# Patient Record
Sex: MALE | Race: WHITE | Employment: OTHER | URBAN - METROPOLITAN AREA
[De-identification: names, ages, dates, MRNs, and addresses within clinical notes are randomized per-mention and may not be internally consistent; named-entity substitution may affect disease eponyms.]

---

## 2017-12-14 ENCOUNTER — OFFICE VISIT (OUTPATIENT)
Dept: INTERNAL MEDICINE CLINIC | Age: 62
End: 2017-12-14

## 2017-12-14 VITALS
RESPIRATION RATE: 12 BRPM | HEART RATE: 71 BPM | BODY MASS INDEX: 30.4 KG/M2 | HEIGHT: 73 IN | DIASTOLIC BLOOD PRESSURE: 80 MMHG | OXYGEN SATURATION: 97 % | SYSTOLIC BLOOD PRESSURE: 118 MMHG | TEMPERATURE: 98.3 F | WEIGHT: 229.4 LBS

## 2017-12-14 DIAGNOSIS — M67.431 GANGLION CYST OF VOLAR ASPECT OF RIGHT WRIST: Primary | ICD-10-CM

## 2017-12-14 DIAGNOSIS — R73.9 HYPERGLYCEMIA: ICD-10-CM

## 2017-12-14 PROCEDURE — G8484 FLU IMMUNIZE NO ADMIN: HCPCS | Performed by: INTERNAL MEDICINE

## 2017-12-14 PROCEDURE — G8427 DOCREV CUR MEDS BY ELIG CLIN: HCPCS | Performed by: INTERNAL MEDICINE

## 2017-12-14 PROCEDURE — 1036F TOBACCO NON-USER: CPT | Performed by: INTERNAL MEDICINE

## 2017-12-14 PROCEDURE — G8417 CALC BMI ABV UP PARAM F/U: HCPCS | Performed by: INTERNAL MEDICINE

## 2017-12-14 PROCEDURE — 3017F COLORECTAL CA SCREEN DOC REV: CPT | Performed by: INTERNAL MEDICINE

## 2017-12-14 PROCEDURE — 99214 OFFICE O/P EST MOD 30 MIN: CPT | Performed by: INTERNAL MEDICINE

## 2017-12-14 RX ORDER — ASPIRIN 325 MG
325 TABLET ORAL DAILY
COMMUNITY

## 2017-12-14 ASSESSMENT — PATIENT HEALTH QUESTIONNAIRE - PHQ9
2. FEELING DOWN, DEPRESSED OR HOPELESS: 0
1. LITTLE INTEREST OR PLEASURE IN DOING THINGS: 0
SUM OF ALL RESPONSES TO PHQ QUESTIONS 1-9: 0
SUM OF ALL RESPONSES TO PHQ9 QUESTIONS 1 & 2: 0

## 2017-12-14 ASSESSMENT — ENCOUNTER SYMPTOMS
SHORTNESS OF BREATH: 0
COUGH: 0

## 2017-12-14 NOTE — PROGRESS NOTES
Subjective:      Patient ID: Gloria Stern is a 65 y.o. male. HPI     Chief Complaint   Patient presents with    Cyst     Right [address]       Hx reviewed  Weight discussed    Saw Dr Mayela Watson a swelling over the wrist last summer, went away  Started coming back 3 weeks ago  Doesn't hurt when working out, does afterward  Not had this before    No direct trauma recalled    Current Outpatient Prescriptions   Medication Sig Dispense Refill    aspirin 325 MG tablet Take 325 mg by mouth daily      MULTIPLE VITAMIN PO Take 1 tablet by mouth daily. No current facility-administered medications for this visit. Allergies:  No Known Allergies  Prior Medical History:       Diagnosis Date    Chronic back pain     Esophageal reflux     Insomnia, unspecified     Other anxiety states      Social History  Social History   Substance Use Topics    Smoking status: Never Smoker    Smokeless tobacco: Never Used    Alcohol use 12.6 oz/week     21 Cans of beer per week     Prior Surgical History:      Procedure Laterality Date    COLONOSCOPY  2015    Dr. Marthann Essex - internal hemorrhoids, polyps, repeat in 5 years    FOOT SURGERY Right     right big toe correction    TONSILLECTOMY AND ADENOIDECTOMY  1963    VASECTOMY      VASECTOMY      WISDOM TOOTH EXTRACTION  1968     Family History:       Problem Relation Age of Onset   [de-identified] Cancer Mother      Breast cancer survivor-80 years old   [de-identified] Diabetes Father       at 79-heavy smoker    Other Sister      Healthy 64year old    Cancer Son 10     hodgkins          Review of Systems   Constitutional: Negative for chills, fever and unexpected weight change. Respiratory: Negative for cough and shortness of breath. Cardiovascular: Negative for chest pain and leg swelling. Musculoskeletal: Negative for arthralgias and myalgias. Neurological: Negative for weakness and numbness. Hematological: Negative for adenopathy.  Does not

## 2018-03-14 ENCOUNTER — OFFICE VISIT (OUTPATIENT)
Dept: INTERNAL MEDICINE CLINIC | Age: 63
End: 2018-03-14

## 2018-03-14 VITALS
TEMPERATURE: 98.1 F | DIASTOLIC BLOOD PRESSURE: 74 MMHG | OXYGEN SATURATION: 98 % | HEIGHT: 73 IN | BODY MASS INDEX: 30.17 KG/M2 | SYSTOLIC BLOOD PRESSURE: 126 MMHG | HEART RATE: 66 BPM | RESPIRATION RATE: 12 BRPM | WEIGHT: 227.6 LBS

## 2018-03-14 DIAGNOSIS — Z11.59 ENCOUNTER FOR HEPATITIS C SCREENING TEST FOR LOW RISK PATIENT: ICD-10-CM

## 2018-03-14 DIAGNOSIS — Z12.5 PROSTATE CANCER SCREENING: ICD-10-CM

## 2018-03-14 DIAGNOSIS — Z00.00 ANNUAL PHYSICAL EXAM: Primary | ICD-10-CM

## 2018-03-14 DIAGNOSIS — H91.8X9 OTHER SPECIFIED FORMS OF HEARING LOSS, UNSPECIFIED LATERALITY: ICD-10-CM

## 2018-03-14 DIAGNOSIS — M67.431 GANGLION CYST OF VOLAR ASPECT OF RIGHT WRIST: ICD-10-CM

## 2018-03-14 DIAGNOSIS — R73.9 HYPERGLYCEMIA: ICD-10-CM

## 2018-03-14 PROBLEM — H91.90 HEARING IMPAIRED: Status: ACTIVE | Noted: 2018-03-14

## 2018-03-14 LAB
BILIRUBIN, POC: NORMAL
BLOOD URINE, POC: NORMAL
CLARITY, POC: CLEAR
COLOR, POC: YELLOW
GLUCOSE URINE, POC: NORMAL
KETONES, POC: NORMAL
LEUKOCYTE EST, POC: NORMAL
NITRITE, POC: NORMAL
PH, POC: 6
PROTEIN, POC: NORMAL
SPECIFIC GRAVITY, POC: 1.01
UROBILINOGEN, POC: 0.2

## 2018-03-14 PROCEDURE — 81002 URINALYSIS NONAUTO W/O SCOPE: CPT | Performed by: INTERNAL MEDICINE

## 2018-03-14 PROCEDURE — 99396 PREV VISIT EST AGE 40-64: CPT | Performed by: INTERNAL MEDICINE

## 2018-03-14 ASSESSMENT — ENCOUNTER SYMPTOMS
COLOR CHANGE: 0
VOICE CHANGE: 0
DIARRHEA: 0
RHINORRHEA: 0
ABDOMINAL DISTENTION: 0
APNEA: 0
NAUSEA: 0
VOMITING: 0
SHORTNESS OF BREATH: 0
COUGH: 0
SINUS PRESSURE: 0
EYE PAIN: 0
CHEST TIGHTNESS: 0
BACK PAIN: 0
WHEEZING: 0
ABDOMINAL PAIN: 0
TROUBLE SWALLOWING: 0
CONSTIPATION: 0

## 2018-03-23 LAB
A/G RATIO: 1.6 (ref 1.1–2.2)
ALBUMIN SERPL-MCNC: 4.7 G/DL (ref 3.4–5)
ALP BLD-CCNC: 45 U/L (ref 40–129)
ALT SERPL-CCNC: 16 U/L (ref 10–40)
ANION GAP SERPL CALCULATED.3IONS-SCNC: 21 MMOL/L (ref 3–16)
AST SERPL-CCNC: 18 U/L (ref 15–37)
BILIRUB SERPL-MCNC: 0.5 MG/DL (ref 0–1)
BUN BLDV-MCNC: 15 MG/DL (ref 7–20)
CALCIUM SERPL-MCNC: 9.5 MG/DL (ref 8.3–10.6)
CHLORIDE BLD-SCNC: 101 MMOL/L (ref 99–110)
CHOLESTEROL, TOTAL: 161 MG/DL (ref 0–199)
CO2: 23 MMOL/L (ref 21–32)
CREAT SERPL-MCNC: 1.1 MG/DL (ref 0.8–1.3)
ESTIMATED AVERAGE GLUCOSE: 116.9 MG/DL
GFR AFRICAN AMERICAN: >60
GFR NON-AFRICAN AMERICAN: >60
GLOBULIN: 2.9 G/DL
GLUCOSE BLD-MCNC: 102 MG/DL (ref 70–99)
HBA1C MFR BLD: 5.7 %
HDLC SERPL-MCNC: 82 MG/DL (ref 40–60)
HEPATITIS C ANTIBODY INTERPRETATION: NORMAL
LDL CHOLESTEROL CALCULATED: 66 MG/DL
POTASSIUM SERPL-SCNC: 4.7 MMOL/L (ref 3.5–5.1)
PROSTATE SPECIFIC ANTIGEN: 0.47 NG/ML (ref 0–4)
SODIUM BLD-SCNC: 145 MMOL/L (ref 136–145)
TOTAL PROTEIN: 7.6 G/DL (ref 6.4–8.2)
TRIGL SERPL-MCNC: 67 MG/DL (ref 0–150)
VLDLC SERPL CALC-MCNC: 13 MG/DL

## 2019-03-14 ENCOUNTER — OFFICE VISIT (OUTPATIENT)
Dept: INTERNAL MEDICINE CLINIC | Age: 64
End: 2019-03-14
Payer: COMMERCIAL

## 2019-03-14 VITALS
BODY MASS INDEX: 30.75 KG/M2 | OXYGEN SATURATION: 97 % | DIASTOLIC BLOOD PRESSURE: 82 MMHG | WEIGHT: 232 LBS | SYSTOLIC BLOOD PRESSURE: 132 MMHG | HEART RATE: 65 BPM | TEMPERATURE: 97.8 F | HEIGHT: 73 IN

## 2019-03-14 DIAGNOSIS — Z12.5 SCREENING FOR PROSTATE CANCER: ICD-10-CM

## 2019-03-14 DIAGNOSIS — Z00.00 PHYSICAL EXAM, ANNUAL: Primary | ICD-10-CM

## 2019-03-14 DIAGNOSIS — K21.00 GASTROESOPHAGEAL REFLUX DISEASE WITH ESOPHAGITIS: Chronic | ICD-10-CM

## 2019-03-14 DIAGNOSIS — F41.8 SITUATIONAL ANXIETY: ICD-10-CM

## 2019-03-14 DIAGNOSIS — Z00.00 PHYSICAL EXAM, ANNUAL: ICD-10-CM

## 2019-03-14 LAB
A/G RATIO: 1.5 (ref 1.1–2.2)
ALBUMIN SERPL-MCNC: 4.5 G/DL (ref 3.4–5)
ALP BLD-CCNC: 53 U/L (ref 40–129)
ALT SERPL-CCNC: 13 U/L (ref 10–40)
ANION GAP SERPL CALCULATED.3IONS-SCNC: 14 MMOL/L (ref 3–16)
AST SERPL-CCNC: 18 U/L (ref 15–37)
BASOPHILS ABSOLUTE: 0 K/UL (ref 0–0.2)
BASOPHILS RELATIVE PERCENT: 0.5 %
BILIRUB SERPL-MCNC: 0.5 MG/DL (ref 0–1)
BUN BLDV-MCNC: 14 MG/DL (ref 7–20)
CALCIUM SERPL-MCNC: 10.3 MG/DL (ref 8.3–10.6)
CHLORIDE BLD-SCNC: 99 MMOL/L (ref 99–110)
CHOLESTEROL, TOTAL: 156 MG/DL (ref 0–199)
CO2: 27 MMOL/L (ref 21–32)
CREAT SERPL-MCNC: 1.2 MG/DL (ref 0.8–1.3)
EOSINOPHILS ABSOLUTE: 0.2 K/UL (ref 0–0.6)
EOSINOPHILS RELATIVE PERCENT: 2.6 %
GFR AFRICAN AMERICAN: >60
GFR NON-AFRICAN AMERICAN: >60
GLOBULIN: 3.1 G/DL
GLUCOSE BLD-MCNC: 105 MG/DL (ref 70–99)
HCT VFR BLD CALC: 46.3 % (ref 40.5–52.5)
HDLC SERPL-MCNC: 71 MG/DL (ref 40–60)
HEMOGLOBIN: 15.5 G/DL (ref 13.5–17.5)
LDL CHOLESTEROL CALCULATED: 71 MG/DL
LYMPHOCYTES ABSOLUTE: 1.8 K/UL (ref 1–5.1)
LYMPHOCYTES RELATIVE PERCENT: 29.9 %
MCH RBC QN AUTO: 29.5 PG (ref 26–34)
MCHC RBC AUTO-ENTMCNC: 33.5 G/DL (ref 31–36)
MCV RBC AUTO: 88 FL (ref 80–100)
MONOCYTES ABSOLUTE: 0.8 K/UL (ref 0–1.3)
MONOCYTES RELATIVE PERCENT: 13.4 %
NEUTROPHILS ABSOLUTE: 3.2 K/UL (ref 1.7–7.7)
NEUTROPHILS RELATIVE PERCENT: 53.6 %
PDW BLD-RTO: 14.9 % (ref 12.4–15.4)
PLATELET # BLD: 198 K/UL (ref 135–450)
PMV BLD AUTO: 7.3 FL (ref 5–10.5)
POTASSIUM SERPL-SCNC: 4.7 MMOL/L (ref 3.5–5.1)
PROSTATE SPECIFIC ANTIGEN: 0.57 NG/ML (ref 0–4)
RBC # BLD: 5.26 M/UL (ref 4.2–5.9)
SODIUM BLD-SCNC: 140 MMOL/L (ref 136–145)
TOTAL PROTEIN: 7.6 G/DL (ref 6.4–8.2)
TRIGL SERPL-MCNC: 72 MG/DL (ref 0–150)
TSH SERPL DL<=0.05 MIU/L-ACNC: 2.89 UIU/ML (ref 0.27–4.2)
VLDLC SERPL CALC-MCNC: 14 MG/DL
WBC # BLD: 5.9 K/UL (ref 4–11)

## 2019-03-14 PROCEDURE — G8484 FLU IMMUNIZE NO ADMIN: HCPCS | Performed by: INTERNAL MEDICINE

## 2019-03-14 PROCEDURE — 99396 PREV VISIT EST AGE 40-64: CPT | Performed by: INTERNAL MEDICINE

## 2019-03-14 RX ORDER — ESOMEPRAZOLE MAGNESIUM 20 MG/1
20 FOR SUSPENSION ORAL DAILY
COMMUNITY

## 2019-03-14 ASSESSMENT — PATIENT HEALTH QUESTIONNAIRE - PHQ9
1. LITTLE INTEREST OR PLEASURE IN DOING THINGS: 0
SUM OF ALL RESPONSES TO PHQ9 QUESTIONS 1 & 2: 0
SUM OF ALL RESPONSES TO PHQ QUESTIONS 1-9: 0
SUM OF ALL RESPONSES TO PHQ QUESTIONS 1-9: 0
2. FEELING DOWN, DEPRESSED OR HOPELESS: 0

## 2019-03-15 LAB
ESTIMATED AVERAGE GLUCOSE: 122.6 MG/DL
HBA1C MFR BLD: 5.9 %

## 2019-04-23 ENCOUNTER — OFFICE VISIT (OUTPATIENT)
Dept: INTERNAL MEDICINE CLINIC | Age: 64
End: 2019-04-23
Payer: COMMERCIAL

## 2019-04-23 VITALS
SYSTOLIC BLOOD PRESSURE: 136 MMHG | WEIGHT: 236.2 LBS | OXYGEN SATURATION: 98 % | DIASTOLIC BLOOD PRESSURE: 88 MMHG | TEMPERATURE: 97.3 F | HEIGHT: 73 IN | HEART RATE: 70 BPM | BODY MASS INDEX: 31.3 KG/M2

## 2019-04-23 DIAGNOSIS — H91.93 DECREASED HEARING OF BOTH EARS: Primary | ICD-10-CM

## 2019-04-23 DIAGNOSIS — H61.23 BILATERAL IMPACTED CERUMEN: ICD-10-CM

## 2019-04-23 PROCEDURE — 3017F COLORECTAL CA SCREEN DOC REV: CPT | Performed by: INTERNAL MEDICINE

## 2019-04-23 PROCEDURE — 99213 OFFICE O/P EST LOW 20 MIN: CPT | Performed by: INTERNAL MEDICINE

## 2019-04-23 PROCEDURE — 1036F TOBACCO NON-USER: CPT | Performed by: INTERNAL MEDICINE

## 2019-04-23 PROCEDURE — G8417 CALC BMI ABV UP PARAM F/U: HCPCS | Performed by: INTERNAL MEDICINE

## 2019-04-23 PROCEDURE — G8427 DOCREV CUR MEDS BY ELIG CLIN: HCPCS | Performed by: INTERNAL MEDICINE

## 2020-07-28 ENCOUNTER — HOSPITAL ENCOUNTER (OUTPATIENT)
Age: 65
Discharge: HOME | End: 2020-07-28
Payer: COMMERCIAL

## 2020-07-28 ENCOUNTER — HOSPITAL ENCOUNTER (OUTPATIENT)
Dept: GENERAL RADIOLOGY | Age: 65
Discharge: HOME | End: 2020-07-28
Payer: COMMERCIAL

## 2020-07-28 ENCOUNTER — NURSE TRIAGE (OUTPATIENT)
Dept: OTHER | Facility: CLINIC | Age: 65
End: 2020-07-28

## 2020-07-28 ENCOUNTER — OFFICE VISIT (OUTPATIENT)
Dept: PRIMARY CARE CLINIC | Age: 65
End: 2020-07-28
Payer: COMMERCIAL

## 2020-07-28 VITALS
SYSTOLIC BLOOD PRESSURE: 118 MMHG | OXYGEN SATURATION: 99 % | DIASTOLIC BLOOD PRESSURE: 70 MMHG | HEART RATE: 93 BPM | RESPIRATION RATE: 15 BRPM | TEMPERATURE: 98.4 F

## 2020-07-28 DIAGNOSIS — M79.671 BILATERAL FOOT PAIN: Chronic | ICD-10-CM

## 2020-07-28 DIAGNOSIS — M79.672 BILATERAL FOOT PAIN: Chronic | ICD-10-CM

## 2020-07-28 LAB
BASOPHILS ABSOLUTE: 0 K/UL (ref 0–0.2)
BASOPHILS RELATIVE PERCENT: 0.2 %
EOSINOPHILS ABSOLUTE: 0 K/UL (ref 0–0.6)
EOSINOPHILS RELATIVE PERCENT: 0.1 %
HCT VFR BLD CALC: 47.1 % (ref 40.5–52.5)
HEMOGLOBIN: 16 G/DL (ref 13.5–17.5)
LYMPHOCYTES ABSOLUTE: 1.1 K/UL (ref 1–5.1)
LYMPHOCYTES RELATIVE PERCENT: 9.4 %
MCH RBC QN AUTO: 31.3 PG (ref 26–34)
MCHC RBC AUTO-ENTMCNC: 33.9 G/DL (ref 31–36)
MCV RBC AUTO: 92.3 FL (ref 80–100)
MONOCYTES ABSOLUTE: 1.3 K/UL (ref 0–1.3)
MONOCYTES RELATIVE PERCENT: 10.9 %
NEUTROPHILS ABSOLUTE: 9.3 K/UL (ref 1.7–7.7)
NEUTROPHILS RELATIVE PERCENT: 79.4 %
PDW BLD-RTO: 13.8 % (ref 12.4–15.4)
PLATELET # BLD: 266 K/UL (ref 135–450)
PMV BLD AUTO: 7.4 FL (ref 5–10.5)
RBC # BLD: 5.1 M/UL (ref 4.2–5.9)
SEDIMENTATION RATE, ERYTHROCYTE: 86 MM/HR (ref 0–20)
URIC ACID, SERUM: 7 MG/DL (ref 3.5–7.2)
WBC # BLD: 11.7 K/UL (ref 4–11)

## 2020-07-28 PROCEDURE — 73630 X-RAY EXAM OF FOOT: CPT

## 2020-07-28 PROCEDURE — 99214 OFFICE O/P EST MOD 30 MIN: CPT | Performed by: INTERNAL MEDICINE

## 2020-07-28 RX ORDER — INDOMETHACIN 50 MG/1
50 CAPSULE ORAL 3 TIMES DAILY
Qty: 60 CAPSULE | Refills: 3 | Status: SHIPPED | OUTPATIENT
Start: 2020-07-28

## 2020-07-28 RX ORDER — METHYLPREDNISOLONE 4 MG/1
TABLET ORAL
Qty: 1 KIT | Refills: 0 | Status: SHIPPED | OUTPATIENT
Start: 2020-07-28

## 2020-07-28 SDOH — ECONOMIC STABILITY: FOOD INSECURITY: WITHIN THE PAST 12 MONTHS, YOU WORRIED THAT YOUR FOOD WOULD RUN OUT BEFORE YOU GOT MONEY TO BUY MORE.: NEVER TRUE

## 2020-07-28 SDOH — ECONOMIC STABILITY: INCOME INSECURITY: HOW HARD IS IT FOR YOU TO PAY FOR THE VERY BASICS LIKE FOOD, HOUSING, MEDICAL CARE, AND HEATING?: NOT VERY HARD

## 2020-07-28 SDOH — ECONOMIC STABILITY: TRANSPORTATION INSECURITY
IN THE PAST 12 MONTHS, HAS LACK OF TRANSPORTATION KEPT YOU FROM MEETINGS, WORK, OR FROM GETTING THINGS NEEDED FOR DAILY LIVING?: NO

## 2020-07-28 SDOH — ECONOMIC STABILITY: FOOD INSECURITY: WITHIN THE PAST 12 MONTHS, THE FOOD YOU BOUGHT JUST DIDN'T LAST AND YOU DIDN'T HAVE MONEY TO GET MORE.: NEVER TRUE

## 2020-07-28 SDOH — ECONOMIC STABILITY: TRANSPORTATION INSECURITY
IN THE PAST 12 MONTHS, HAS THE LACK OF TRANSPORTATION KEPT YOU FROM MEDICAL APPOINTMENTS OR FROM GETTING MEDICATIONS?: NO

## 2020-07-28 ASSESSMENT — PATIENT HEALTH QUESTIONNAIRE - PHQ9
SUM OF ALL RESPONSES TO PHQ QUESTIONS 1-9: 0
2. FEELING DOWN, DEPRESSED OR HOPELESS: 0
SUM OF ALL RESPONSES TO PHQ9 QUESTIONS 1 & 2: 0
1. LITTLE INTEREST OR PLEASURE IN DOING THINGS: 0
SUM OF ALL RESPONSES TO PHQ QUESTIONS 1-9: 0

## 2020-07-28 NOTE — ASSESSMENT & PLAN NOTE
The pain is in the toes foot and ankles, sudden onset, sharp pain, starts in the toes and spreads up to the ankle and lower legs,  Some redness , some swelling , warm to touch, , hurts to bear weight,  No efver or chills,  No falls or injuries,  Has beend rinking a lot of beer this season, no fam hx gout,  Acute onset,   Will get blood work, will get xrays,  Will start indocin, and medrol dosepack.

## 2020-07-28 NOTE — PROGRESS NOTES
Subjective:      Patient ID: Shae Kemp is a 60 y.o. male. HPI  Established patient here for a visit to manage acute and chronic medical conditions as detailed below. Bilateral foot pain  The pain is in the toes foot and ankles, sudden onset, sharp pain, starts in the toes and spreads up to the ankle and lower legs,  Some redness , some swelling , warm to touch, , hurts to bear weight,  No efver or chills,  No falls or injuries,  Has beend rinking a lot of beer this season, no fam hx gout,  Acute onset,   Will get blood work, will get xrays,  Will start indocin, and medrol dosepack. Situational anxiety  Has been on celexa in the past,  Anxiety is under good control. Gastroesophageal reflux disease with esophagitis  On otc meds and prilosec as needed,  Patient is compliant w medications, no side effects, effective, provides adequate symptom relief. No new symptoms or problems as noted by patient. The problem is stable, no changes noted by patient. Will consider monitoring labs and refill medications as appropriate. Patient counseled and will continue current plan. Review of Systems  ROS: No unusual headaches or allergy symptoms or blurred vision. No prolonged cough. No flushing or facial pain or chest pain,dizziness, dyspnea, palpitations, or chest pain on exertion. No syncope. No nausea or vommitting or diarrhea. No jaundice or abdominal pain, change in bowel habits, black or bloody stools. No dysuria or hematuria or frequency of urination. No myalgias or muscle pain. No numbness, weakness, or tingling. No falls, or loss of consciousness. No weight loss or back pain. No falls. No paresthesias. No joint swelling or redness. No joint pain. No recent weight loss. No focal weakness or sensory deficits or paresthesias, No confusion or altered sensorium. No hematemesis. No hearing loss. No siezures. All other systems were reviewed, and review was negative.    Objective:   Physical Exam  BP 118/70 (Site: Left Upper Arm, Position: Sitting, Cuff Size: Medium Adult)   Pulse 93   Temp 98.4 °F (36.9 °C) (Oral)   Resp 15   SpO2 99%    The physical exam reveals a patient who appears well, alert and oriented x 3, pleasant, cooperative. Vitals are as noted. Head is atraumatic and normocephalic. Eyes reveal normal conjunctiva, cornea normal, pupils are equal and rective to light. Nasal mucosa is normal. Throat is normal without exudates. Ears reveal normal tympanic membranes. Neck is supple and free of adenopathy, or masses. No thyromegaly. No jugular venous distension. Lungs are clear to auscultation, no rales or rhonchi noted. Heart sounds are regular , no murmurs, clicks, gallops or rubs. Abdomen is soft, no tenderness, masses or organomegaly. Bowel sounds are normally heard. Pelvis: normal. Extremities are normal. Peripheral pulses are normal. Screening neurological exam is normal without focal findings. Cranial nerves are intact, reflexes are symmetrical and muscle strength eaqual. Skin is normal without suspicious lesions noted. Assessment:      Bilateral foot pain  The pain is in the toes foot and ankles, sudden onset, sharp pain, starts in the toes and spreads up to the ankle and lower legs,  Some redness , some swelling , warm to touch, , hurts to bear weight,  No efver or chills,  No falls or injuries,  Has beend rinking a lot of beer this season, no fam hx gout,  Acute onset,   Will get blood work, will get xrays,  Will start indocin, and medrol dosepack. Situational anxiety  Has been on celexa in the past,  Anxiety is under good control. Gastroesophageal reflux disease with esophagitis  On otc meds and prilosec as needed,  Patient is compliant w medications, no side effects, effective, provides adequate symptom relief. No new symptoms or problems as noted by patient. The problem is stable, no changes noted by patient. Will consider monitoring labs and refill medications as appropriate.

## 2020-07-28 NOTE — ASSESSMENT & PLAN NOTE
On otc meds and prilosec as needed,  Patient is compliant w medications, no side effects, effective, provides adequate symptom relief. No new symptoms or problems as noted by patient. The problem is stable, no changes noted by patient. Will consider monitoring labs and refill medications as appropriate. Patient counseled and will continue current plan.

## 2020-07-28 NOTE — TELEPHONE ENCOUNTER
Reason for Disposition   SEVERE pain (e.g., excruciating, unable to do any normal activities)    Answer Assessment - Initial Assessment Questions  1. ONSET: \"When did the pain start? \"       Friday afternoon  2. LOCATION: \"Where is the pain located? \"       Bilateral feet - left foot is more painful that the right  3. PAIN: \"How bad is the pain? \"    (Scale 1-10; or mild, moderate, severe)    -  MILD (1-3): doesn't interfere with normal activities     -  MODERATE (4-7): interferes with normal activities (e.g., work or school) or awakens from sleep, limping     -  SEVERE (8-10): excruciating pain, unable to do any normal activities, unable to walk      Pain lying down (1-2/10); Pain while standing (9/10); patient is unable to walk without great discomgort  4. WORK OR EXERCISE: \"Has there been any recent work or exercise that involved this part of the body? \"       No injuries  5. CAUSE: \"What do you think is causing the foot pain? \"      He thinks it is gout  6. OTHER SYMPTOMS: \"Do you have any other symptoms? \" (e.g., leg pain, rash, fever, numbness)      Fever (for the last two weeks)   7. PREGNANCY: \"Is there any chance you are pregnant? \" \"When was your last menstrual period? \"      no    Protocols used: FOOT PAIN-ADULT-[place]    Received call from [facility]. Call soft transferred to [facility] to schedule appointment. Please do not reply to the triage nurse through this encounter. Any subsequent communication should be directly with the patient. Patient c/o of bilateral foot pain and swelling that started last Friday night and has progressively grown worse. Swelling is only slight but pain when standing or waling reaches a 9-10/10. Patient states that he will be able to get to the car with the help of his wife,  but may need a wheelchair to enter the doctors offices.   In addition, patient is reporting an intermittent fever that began about 2 weeks ago (100-101) but appears to be resolving. Recommended patient be seen today. Care advice provided. Warm transfer to service [facility])  for physician contact/appointment scheduling.

## 2020-07-29 LAB
ALBUMIN SERPL-MCNC: 3.8 G/DL (ref 3.5–5.7)
ALP BLD-CCNC: 34 U/L (ref 36–125)
ALT SERPL-CCNC: 11 U/L (ref 7–52)
AST SERPL-CCNC: 24 U/L (ref 13–39)
BILIRUB SERPL-MCNC: 0.4 MG/DL (ref 0–1.5)
BILIRUBIN DIRECT: 0.1 MG/DL (ref 0–0.4)
BILIRUBIN, INDIRECT: 0.3 MG/DL (ref 0–1.1)
HBA1C MFR BLD: 6 % (ref 4–5.6)
TOTAL PROTEIN: 7.6 G/DL (ref 6.4–8.9)

## 2020-07-30 LAB
TROPONIN I: <0.04 NG/ML (ref 0–0.03)
TROPONIN I: <0.04 NG/ML (ref 0–0.03)